# Patient Record
Sex: FEMALE | Race: WHITE | NOT HISPANIC OR LATINO | URBAN - NONMETROPOLITAN AREA
[De-identification: names, ages, dates, MRNs, and addresses within clinical notes are randomized per-mention and may not be internally consistent; named-entity substitution may affect disease eponyms.]

---

## 2016-08-09 PROBLEM — H16.223: Noted: 2019-06-26

## 2019-06-26 ENCOUNTER — IMPORTED ENCOUNTER (OUTPATIENT)
Dept: URBAN - NONMETROPOLITAN AREA CLINIC 1 | Facility: CLINIC | Age: 54
End: 2019-06-26

## 2019-06-26 PROCEDURE — 92014 COMPRE OPH EXAM EST PT 1/>: CPT

## 2019-06-26 PROCEDURE — 92015 DETERMINE REFRACTIVE STATE: CPT

## 2019-06-26 NOTE — PATIENT DISCUSSION
Simple Astigmatism OD/Compound Hyperopuic Astigmatism OS w/Presbyopia-  discussed findings w/patient-  new spectacle Rx issued-  montior yearly or prn HOLLI OU -  diuscussed findings w/patient-  2+ SPK noted today-  start Refresh Jeramy 3 QID OU  samples given -  monitor 1 month f/u or prn; 's Notes: MR 6/26/2019DFE 6/26/2019

## 2019-07-25 ENCOUNTER — IMPORTED ENCOUNTER (OUTPATIENT)
Dept: URBAN - NONMETROPOLITAN AREA CLINIC 1 | Facility: CLINIC | Age: 54
End: 2019-07-25

## 2019-07-25 PROBLEM — H52.03: Noted: 2019-07-25

## 2019-07-25 PROBLEM — H16.223: Noted: 2019-07-25

## 2019-07-25 PROBLEM — H52.223: Noted: 2019-07-25

## 2019-07-25 PROBLEM — H52.4: Noted: 2019-07-25

## 2019-07-25 PROCEDURE — 99213 OFFICE O/P EST LOW 20 MIN: CPT

## 2019-07-25 NOTE — PATIENT DISCUSSION
HOLLI MACKAY -  discussed findings w/patient-  SPK slightly better OD no better OS-  start PF 1% QID OU x 2 weeks-  continue AT's in between Rx drops-  RTC 2 weeks f/u; 's Notes: MR 6/26/2019DFE 6/26/2019

## 2019-08-13 ENCOUNTER — IMPORTED ENCOUNTER (OUTPATIENT)
Dept: URBAN - NONMETROPOLITAN AREA CLINIC 1 | Facility: CLINIC | Age: 54
End: 2019-08-13

## 2019-08-13 PROCEDURE — 99213 OFFICE O/P EST LOW 20 MIN: CPT

## 2019-08-13 NOTE — PATIENT DISCUSSION
HOLLI OU -  discussed findings w/patient-  patient does not notice an improvement in how things are feeling but clinically there is much improvement-  there are no SPK noted OD or OS today-  taper PF 1% to BID OU x 2 weeks then QD OU x 2 weeks then d/c-  continue AT's BID OU-  RTC 1 mo or prn; 's Notes: MR 6/26/2019DFE 6/26/2019

## 2019-09-13 ENCOUNTER — IMPORTED ENCOUNTER (OUTPATIENT)
Dept: URBAN - NONMETROPOLITAN AREA CLINIC 1 | Facility: CLINIC | Age: 54
End: 2019-09-13

## 2019-09-13 PROCEDURE — 99213 OFFICE O/P EST LOW 20 MIN: CPT

## 2019-09-13 NOTE — PATIENT DISCUSSION
HOLLI OU -  discussed findings w/patient-  patient still does not notice much improvement-  she has colitis and eyes seem to be worse during flare-ups-  start Restasis BID OU sent Rx to [facility] in [facility] today-  start FML BID OU sent Rx-  continue Refresh AT's PRN OU-  RTC 6 weeks f/u HOLLI; 's Notes: MR 6/26/2019DFE 6/26/2019

## 2019-10-25 ENCOUNTER — IMPORTED ENCOUNTER (OUTPATIENT)
Dept: URBAN - NONMETROPOLITAN AREA CLINIC 1 | Facility: CLINIC | Age: 54
End: 2019-10-25

## 2019-10-25 PROCEDURE — 99213 OFFICE O/P EST LOW 20 MIN: CPT

## 2019-10-25 NOTE — PATIENT DISCUSSION
HOLLI OU -  discussed findings w/patient-  patient still does not notice much improvement-  she has colitis and eyes seem to be worse during flare-ups-  OD noted much worse than OS today. Patient sleeps on her right side suspect this may be contributing to the dryness. Will have patient start a Gel QHS.  -  continue Restasis BID OU Rx sent to [address] [place] [address] -  start FML QID OD sent Rx [facility] in [facility] -  continue Refresh Jeramy 3's PRN OU-  start Refresh Gel QHS OU -  RTC 2 weeks f/u HOLLI; 's Notes: MR 6/26/2019DFE 6/26/2019

## 2019-11-04 ENCOUNTER — IMPORTED ENCOUNTER (OUTPATIENT)
Dept: URBAN - NONMETROPOLITAN AREA CLINIC 1 | Facility: CLINIC | Age: 54
End: 2019-11-04

## 2019-11-04 PROCEDURE — 99213 OFFICE O/P EST LOW 20 MIN: CPT

## 2019-11-04 NOTE — PATIENT DISCUSSION
HOLLI OU -  discussed findings w/patient-  patient still does not notice much improvement though condition has improved OU -  she has colitis and eyes seem to be worse during flare-ups-  Patient sleeps on her right side suspect this may be contributing to the dryness. Will have patient continue a Gel QHS.  -  continue Restasis BID OU-  taper FML BID OU x 1 week then QD OU x 1 week then d/c -  continue Refresh Jeramy 3's PRN OU-  continue Refresh Gel QHS OU -  RTC 3 month f/u MEGHAN Bentley's Notes: MR 6/26/2019DFE 6/26/2019

## 2020-02-03 ENCOUNTER — IMPORTED ENCOUNTER (OUTPATIENT)
Dept: URBAN - NONMETROPOLITAN AREA CLINIC 1 | Facility: CLINIC | Age: 55
End: 2020-02-03

## 2020-02-03 PROCEDURE — 99213 OFFICE O/P EST LOW 20 MIN: CPT

## 2020-02-03 NOTE — PATIENT DISCUSSION
HOLLI OU -  discussed findings w/patient-  patient still does not notice much improvement though condition has improved OU -  she has colitis and eyes seem to be worse during flare-ups-  Patient sleeps on her right side suspect this may be contributing to the dryness. Will have patient continue a Gel QHS. -  continue Restasis BID OU-  advised patient it often takes at least 6 months for Restasis to build in the system and the patient to notice improvements.  If patient is still having difficulty at next appt will consider switching to a new medication such as Xiidra or [place]-  continue Refresh Jeramy 3's PRN OU-  continue Refresh Gel QHS OU -  RTC 6 month Complete; 's Notes: MR 6/26/2019DFE 6/26/2019

## 2020-07-06 ENCOUNTER — IMPORTED ENCOUNTER (OUTPATIENT)
Dept: URBAN - NONMETROPOLITAN AREA CLINIC 1 | Facility: CLINIC | Age: 55
End: 2020-07-06

## 2020-07-06 PROBLEM — H16.223: Noted: 2020-07-06

## 2020-07-06 PROBLEM — H52.4: Noted: 2020-07-06

## 2020-07-06 PROBLEM — H52.223: Noted: 2020-07-06

## 2020-07-06 PROBLEM — H52.03: Noted: 2020-07-06

## 2020-07-06 PROCEDURE — 92015 DETERMINE REFRACTIVE STATE: CPT

## 2020-07-06 PROCEDURE — 92014 COMPRE OPH EXAM EST PT 1/>: CPT

## 2020-07-06 NOTE — PATIENT DISCUSSION
Compound Hyperopic Astigmatism OU w/Presbyopia-  discussed findings w/patient-  new spectacle Rx issued-  continue to monitor yearly or prnDES OU -  discussed findings w/patient-  patient feels that her eyes are worsening-  clinically there is more SPK noted OU today-  start Xiidra BID OU-  continue Restasis BID OU for now-  continue Refresh Jeramy 3's PRN OU-  continue Refresh Gel QHS OU -  RTC 2-3 weeks f/u or prn; 's Notes: MR 7/6/2020DFE 7/6/2020

## 2020-07-20 ENCOUNTER — IMPORTED ENCOUNTER (OUTPATIENT)
Dept: URBAN - NONMETROPOLITAN AREA CLINIC 1 | Facility: CLINIC | Age: 55
End: 2020-07-20

## 2020-07-20 PROBLEM — H02.88A: Noted: 2021-01-08

## 2020-07-20 PROBLEM — H16.223: Noted: 2020-07-20

## 2020-07-20 PROBLEM — H52.03: Noted: 2020-07-20

## 2020-07-20 PROBLEM — H52.223: Noted: 2020-07-20

## 2020-07-20 PROBLEM — H52.4: Noted: 2020-07-20

## 2020-07-20 PROBLEM — H02.88B: Noted: 2021-01-08

## 2020-07-20 PROCEDURE — 99213 OFFICE O/P EST LOW 20 MIN: CPT

## 2020-07-20 NOTE — PATIENT DISCUSSION
HOLLI OU -  discussed findings w/patient-  patient feels that her eyes are no better-  Xiidra burns upon instillation will have patient d/c at this time-  start FML TID OU-  continue Restasis BID OU -  continue Refresh Jeramy 3's PRN OU-  continue Refresh Gel QHS OU -  RTC 2 weeks f/u HOLLI; 's Notes: HOLLI treatment: [employer] burningMR 7/6/2020DFE 7/6/2020

## 2020-08-03 ENCOUNTER — IMPORTED ENCOUNTER (OUTPATIENT)
Dept: URBAN - NONMETROPOLITAN AREA CLINIC 1 | Facility: CLINIC | Age: 55
End: 2020-08-03

## 2020-08-03 PROCEDURE — 99213 OFFICE O/P EST LOW 20 MIN: CPT

## 2020-08-03 NOTE — PATIENT DISCUSSION
HOLLI OU -  discussed findings w/patient-  patient feels that her eyes are no better-  taper FML 0.1% to QD OU until bottle is empty-  continue Restasis BID OU -  continue Refresh Jeramy 3's PRN OU-  continue Refresh Gel QHS OU -  RTC 4-6 weeks f/u; 's Notes: HOLLI treatment: d/c Melania Hahn burningMR 7/6/2020DFE 7/6/2020

## 2020-09-17 ENCOUNTER — IMPORTED ENCOUNTER (OUTPATIENT)
Dept: URBAN - NONMETROPOLITAN AREA CLINIC 1 | Facility: CLINIC | Age: 55
End: 2020-09-17

## 2020-09-17 PROCEDURE — 99213 OFFICE O/P EST LOW 20 MIN: CPT

## 2020-09-17 NOTE — PATIENT DISCUSSION
HOLLI OU -  discussed findings w/patient-  patient still does not feel eyes are better but she has no SPK OU today-  continue Restasis BID OU -  continue Refresh Jeramy 3's PRN OU-  continue Refresh Gel QHS OU -  RTC 3 mo f/u; 's Notes: HOLLI treatment: d/c Hong Adam [facility] 7/6/2020DFE 7/6/2020

## 2021-01-08 ENCOUNTER — IMPORTED ENCOUNTER (OUTPATIENT)
Dept: URBAN - NONMETROPOLITAN AREA CLINIC 1 | Facility: CLINIC | Age: 56
End: 2021-01-08

## 2021-01-08 PROCEDURE — 99213 OFFICE O/P EST LOW 20 MIN: CPT

## 2021-01-08 NOTE — PATIENT DISCUSSION
HOLLI OU/MGD OU/Blepharitis OU-  discussed findings w/patient-  patient has worsening SPK at this time-  continue Restasis BID OU -  continue Refresh Jeramy 3's PRN OU-  continue Refresh Gel QHS OU -  start Doxycycline 50mg QD-  start Avenova QD OU-  start Hot Compresses QD OU-  RTC 3-4 weeks f/u or prn; Joselito Notes: HOLLI treatment: d/c Shavonne Goncalves burningMR 7/6/2020DFE 7/6/2020

## 2021-01-28 ENCOUNTER — IMPORTED ENCOUNTER (OUTPATIENT)
Dept: URBAN - NONMETROPOLITAN AREA CLINIC 1 | Facility: CLINIC | Age: 56
End: 2021-01-28

## 2021-01-28 PROCEDURE — 99213 OFFICE O/P EST LOW 20 MIN: CPT

## 2021-01-28 NOTE — PATIENT DISCUSSION
HOLLI OU/MGD OU/Blepharitis OU-  discussed findings w/patient-  patient has worsening SPK at this time-  continue Restasis BID OU -  continue Refresh Jeramy 3's PRN OU-  continue Refresh Gel QHS OU -  continue Doxycycline 50mg QD-  continue  Avenova QD OU-  continue Hot Compresses QD OU-  RTC 3 mo f/u HOLLI or prn; 's Notes: HOLLI treatment: d/c Laura Muñoz burning 7/6/2020DFE 7/6/2020

## 2021-04-14 ENCOUNTER — IMPORTED ENCOUNTER (OUTPATIENT)
Dept: URBAN - NONMETROPOLITAN AREA CLINIC 1 | Facility: CLINIC | Age: 56
End: 2021-04-14

## 2021-04-14 PROCEDURE — 99213 OFFICE O/P EST LOW 20 MIN: CPT

## 2021-04-14 NOTE — PATIENT DISCUSSION
HOLLI OU/MGD OU/Blepharitis OU-  discussed findings w/patient-  patient has worsening SPK at this time-  continue Restasis BID OU -  continue Refresh Jeramy 3's PRN OU-  continue Refresh Gel QHS OU -  continue Doxycycline 50mg QD-  continue  Avenova QD OU-  continue Hot Compresses QD OU-  start Lotemax QID OU x 2 weeks-  RTC at patient's convience f/u HOLLI/ Ocular Allergies; 's Notes: HOLLI treatment: d/c Elton Koyanagi burning 7/6/2020DFE 7/6/2020

## 2021-05-21 ENCOUNTER — IMPORTED ENCOUNTER (OUTPATIENT)
Dept: URBAN - NONMETROPOLITAN AREA CLINIC 1 | Facility: CLINIC | Age: 56
End: 2021-05-21

## 2021-05-21 PROBLEM — H02.88A: Noted: 2021-05-21

## 2021-05-21 PROBLEM — H16.141: Noted: 2021-05-21

## 2021-05-21 PROBLEM — H16.223: Noted: 2021-05-21

## 2021-05-21 PROBLEM — H02.88B: Noted: 2021-05-21

## 2021-05-21 PROCEDURE — 99213 OFFICE O/P EST LOW 20 MIN: CPT

## 2021-05-21 PROCEDURE — 92071 CONTACT LENS FITTING FOR TX: CPT

## 2021-05-21 NOTE — PATIENT DISCUSSION
HOLLI OU/MGD OU/Blepharitis OU/Punctate Keratitis OD-  discussed findings w/patient-  huge improvement OD & OS but there is still extensive SPK OD-  continue Restasis BID OU -  continue Refresh Jeramy 3's PRN OU-  continue Refresh Gel QHS OU -  continue Doxycycline 50mg QD-  continue  Avenova QD OU-  continue Hot Compresses QD OU-  placed BCL OD today-  RTC 1 week or prn; 's Notes: HOLLI treatment: d/c Gucci Larios [facility] 7/6/2020DFE 7/6/2020

## 2021-05-28 ENCOUNTER — IMPORTED ENCOUNTER (OUTPATIENT)
Dept: URBAN - NONMETROPOLITAN AREA CLINIC 1 | Facility: CLINIC | Age: 56
End: 2021-05-28

## 2021-05-28 PROCEDURE — 99213 OFFICE O/P EST LOW 20 MIN: CPT

## 2021-05-28 NOTE — PATIENT DISCUSSION
HOLLI OU/MGD OU/Blepharitis OU/Punctate Keratitis OD-  discussed findings w/patient-  no SPK noted today OD-  BCL removed OD-  continue Restasis BID OU -  continue Refresh Jeramy 3's PRN OU-  continue Refresh Gel QHS OU -  continue Doxycycline 50mg QD-  continue  Avenova QD OU-  continue Hot Compresses QD OU-  2-3 mo Routine; 's Notes: HOLLI treatment: d/c Vern Haro burningMR 7/6/2020DFE 7/6/2020

## 2021-08-19 ENCOUNTER — IMPORTED ENCOUNTER (OUTPATIENT)
Dept: URBAN - NONMETROPOLITAN AREA CLINIC 1 | Facility: CLINIC | Age: 56
End: 2021-08-19

## 2021-08-19 PROCEDURE — 92014 COMPRE OPH EXAM EST PT 1/>: CPT

## 2021-08-19 PROCEDURE — 92015 DETERMINE REFRACTIVE STATE: CPT

## 2021-08-19 NOTE — PATIENT DISCUSSION
Mixed Hyperopic Astigmatism w/Presbyopia -  discussed findings w/patient -  new spectacle Rx issued -  continue to monitor HOLLI OU/MGD OU/Blepharitis OU/Punctate Keratitis OD-  discussed findings w/patient-  no SPK noted today-  continue Restasis BID OU -  continue Refresh Jeramy 3's PRN OU-  continue Refresh Gel QHS OU -  continue Doxycycline 50mg QD-  continue  Avenova QD OU-  continue Hot Compresses QD OU; 's Notes: HOLLI treatment: d/c Cece Sanchez burning 7/6/2020DFE 7/6/2020

## 2021-08-30 PROBLEM — H16.141: Noted: 2021-08-30

## 2021-08-30 PROBLEM — H02.88B: Noted: 2021-08-30

## 2021-08-30 PROBLEM — H16.223: Noted: 2021-08-30

## 2021-08-30 PROBLEM — H02.88A: Noted: 2021-08-30

## 2021-08-30 PROBLEM — H52.03: Noted: 2021-08-30

## 2021-11-08 ENCOUNTER — IMPORTED ENCOUNTER (OUTPATIENT)
Dept: URBAN - NONMETROPOLITAN AREA CLINIC 1 | Facility: CLINIC | Age: 56
End: 2021-11-08

## 2021-11-08 PROBLEM — H02.88A: Noted: 2021-11-08

## 2021-11-08 PROBLEM — H02.88B: Noted: 2021-11-08

## 2021-11-08 PROBLEM — H16.223: Noted: 2021-11-08

## 2021-11-08 PROBLEM — H52.03: Noted: 2021-11-08

## 2021-11-08 PROCEDURE — 99213 OFFICE O/P EST LOW 20 MIN: CPT

## 2021-11-08 NOTE — PATIENT DISCUSSION
HOLLI OU/MGD OU/Blepharitis OU/Punctate Keratitis OD-  discussed findings w/patient-  patient's symptoms worsening at this time-  clinically no change in findings-  start Tyrvaya BID-  continue Restasis BID OU -  continue Refresh Jeramy 3's PRN OU-  continue Doxycycline 50mg BID PO-  continue  Avenova QD OU-  continue Hot Compresses QD OU-  RTC 2 weeks f/u or prn; 's Notes: HOLLI treatment: d/c Migue Mueller burning 7/6/2020DFE 7/6/2020

## 2021-11-22 ENCOUNTER — IMPORTED ENCOUNTER (OUTPATIENT)
Dept: URBAN - NONMETROPOLITAN AREA CLINIC 1 | Facility: CLINIC | Age: 56
End: 2021-11-22

## 2021-11-22 PROCEDURE — 99213 OFFICE O/P EST LOW 20 MIN: CPT

## 2021-11-22 NOTE — PATIENT DISCUSSION
HOLLI OU/MGD OU/Blepharitis OU/Punctate Keratitis OD-  discussed findings w/patient-  patient is much better at this time-  continue Tyrvaya BID sent in ERx to [facility]-  continue Restasis QD OU-  continue Refresh Jeramy 3's PRN OU-  continue Doxycycline 50mg BID PO-  continue  Avenova QD OU-  continue Hot Compresses QD OU-  RTC 1 year f/u or prn; Dr's Notes: HOLLI treatment: d/c Geetha holly; darcy Piña and will continue Restasis [facility] 7/6/2020DFE 7/6/2020

## 2022-04-10 ASSESSMENT — VISUAL ACUITY
OD_SC: 20/25
OS_SC: 20/50
OD_SC: 20/20-
OS_PH: 20/25
OD_SC: 20/20
OS_SC: 20/50
OD_SC: 20/20
OS_SC: 20/25
OU_CC: 20/30
OS_SC: 20/25
OU_SC: 20/20-1
OU_SC: 20/20 -2
OS_SC: 20/25
OD_CC: 20/20
OU_SC: 20/20
OU_CC: 20/20
OS_SC: 20/25
OD_SC: 20/25+1
OS_PH: 20/30+2
OS_CC: 20/40
OD_SC: 20/20
OD_SC: 20/25+
OD_SC: 20/70
OU_SC: 20/20
OD_SC: 20/30
OD_SC: 20/25
OD_SC: 20/20
OU_CC: J1
OS_SC: 20/25+
OS_SC: 20/20-
OS_SC: 20/30
OD_SC: 20/25
OU_CC: 20/20
OD_SC: 20/20
OD_PH: 20/25
OS_SC: 20/25
OS_PH: 20/25
OD_SC: 20/20
OS_PH: 20/30
OS_SC: 20/30+2
OS_SC: 20/30-
OS_SC: 20/25-2
OS_SC: 20/30 +2
OD_SC: 20/20
OD_SC: 20/30+2
OU_CC: 20/25
OS_SC: 20/25
OS_SC: 20/25-

## 2022-04-10 ASSESSMENT — TONOMETRY
OS_IOP_MMHG: 19
OS_IOP_MMHG: 15
OD_IOP_MMHG: 14
OS_IOP_MMHG: 16
OS_IOP_MMHG: 16
OD_IOP_MMHG: 15
OD_IOP_MMHG: 12
OS_IOP_MMHG: 19
OD_IOP_MMHG: 15
OS_IOP_MMHG: 15
OS_IOP_MMHG: 15
OD_IOP_MMHG: 15
OS_IOP_MMHG: 15
OD_IOP_MMHG: 19
OS_IOP_MMHG: 17
OD_IOP_MMHG: 14
OS_IOP_MMHG: 15
OS_IOP_MMHG: 19
OD_IOP_MMHG: 16
OD_IOP_MMHG: 15
OS_IOP_MMHG: 14
OS_IOP_MMHG: 18
OS_IOP_MMHG: 15
OD_IOP_MMHG: 17
OD_IOP_MMHG: 15
OD_IOP_MMHG: 15
OS_IOP_MMHG: 13
OD_IOP_MMHG: 16
OD_IOP_MMHG: 18
OS_IOP_MMHG: 16
OD_IOP_MMHG: 16
OD_IOP_MMHG: 17
OD_IOP_MMHG: 20
OS_IOP_MMHG: 15
OS_IOP_MMHG: 17
OS_IOP_MMHG: 16
OD_IOP_MMHG: 19

## 2024-10-30 ENCOUNTER — OFFICE (OUTPATIENT)
Dept: URBAN - NONMETROPOLITAN AREA CLINIC 1 | Facility: CLINIC | Age: 59
End: 2024-10-30
Payer: COMMERCIAL

## 2024-10-30 VITALS
HEIGHT: 65 IN | DIASTOLIC BLOOD PRESSURE: 84 MMHG | HEART RATE: 80 BPM | WEIGHT: 184 LBS | DIASTOLIC BLOOD PRESSURE: 88 MMHG | SYSTOLIC BLOOD PRESSURE: 131 MMHG | SYSTOLIC BLOOD PRESSURE: 130 MMHG

## 2024-10-30 DIAGNOSIS — Z80.0 FAMILY HISTORY OF MALIGNANT NEOPLASM OF DIGESTIVE ORGANS: ICD-10-CM

## 2024-10-30 DIAGNOSIS — K51.90 ULCERATIVE COLITIS, UNSPECIFIED, WITHOUT COMPLICATIONS: ICD-10-CM

## 2024-10-30 PROCEDURE — 99204 OFFICE O/P NEW MOD 45 MIN: CPT | Performed by: NURSE PRACTITIONER

## 2024-10-30 RX ORDER — MESALAMINE 1.2 G/1
TABLET, DELAYED RELEASE ORAL
Qty: 90 | Refills: 3 | Status: ACTIVE

## 2025-01-30 ENCOUNTER — OFFICE (OUTPATIENT)
Dept: URBAN - NONMETROPOLITAN AREA CLINIC 1 | Facility: CLINIC | Age: 60
End: 2025-01-30
Payer: COMMERCIAL

## 2025-01-30 VITALS
WEIGHT: 183 LBS | DIASTOLIC BLOOD PRESSURE: 91 MMHG | HEIGHT: 65 IN | HEART RATE: 112 BPM | SYSTOLIC BLOOD PRESSURE: 145 MMHG | SYSTOLIC BLOOD PRESSURE: 158 MMHG | DIASTOLIC BLOOD PRESSURE: 80 MMHG

## 2025-01-30 DIAGNOSIS — R10.32 LEFT LOWER QUADRANT PAIN: ICD-10-CM

## 2025-01-30 DIAGNOSIS — Z80.0 FAMILY HISTORY OF MALIGNANT NEOPLASM OF DIGESTIVE ORGANS: ICD-10-CM

## 2025-01-30 DIAGNOSIS — K51.90 ULCERATIVE COLITIS, UNSPECIFIED, WITHOUT COMPLICATIONS: ICD-10-CM

## 2025-01-30 DIAGNOSIS — R10.31 RIGHT LOWER QUADRANT PAIN: ICD-10-CM

## 2025-01-30 PROCEDURE — 99214 OFFICE O/P EST MOD 30 MIN: CPT | Performed by: NURSE PRACTITIONER

## 2025-01-30 RX ORDER — PREDNISONE 10 MG/1
TABLET ORAL
Qty: 70 | Refills: 0 | Status: ACTIVE
Start: 2025-01-30

## 2025-02-12 ENCOUNTER — AMBULATORY SURGICAL CENTER (OUTPATIENT)
Dept: URBAN - NONMETROPOLITAN AREA SURGERY 1 | Facility: SURGERY | Age: 60
End: 2025-02-12

## 2025-02-12 ENCOUNTER — OFFICE (OUTPATIENT)
Dept: URBAN - METROPOLITAN AREA PATHOLOGY 15 | Facility: PATHOLOGY | Age: 60
End: 2025-02-12

## 2025-02-12 VITALS
DIASTOLIC BLOOD PRESSURE: 64 MMHG | RESPIRATION RATE: 17 BRPM | RESPIRATION RATE: 18 BRPM | SYSTOLIC BLOOD PRESSURE: 119 MMHG | HEART RATE: 137 BPM | HEART RATE: 115 BPM | DIASTOLIC BLOOD PRESSURE: 66 MMHG | HEART RATE: 123 BPM | OXYGEN SATURATION: 98 % | SYSTOLIC BLOOD PRESSURE: 93 MMHG | RESPIRATION RATE: 16 BRPM | SYSTOLIC BLOOD PRESSURE: 92 MMHG | SYSTOLIC BLOOD PRESSURE: 97 MMHG | DIASTOLIC BLOOD PRESSURE: 81 MMHG | WEIGHT: 176 LBS | DIASTOLIC BLOOD PRESSURE: 66 MMHG | SYSTOLIC BLOOD PRESSURE: 119 MMHG | HEART RATE: 137 BPM | HEART RATE: 106 BPM | OXYGEN SATURATION: 95 % | HEART RATE: 127 BPM | DIASTOLIC BLOOD PRESSURE: 98 MMHG | SYSTOLIC BLOOD PRESSURE: 106 MMHG | DIASTOLIC BLOOD PRESSURE: 81 MMHG | HEART RATE: 125 BPM | HEART RATE: 125 BPM | DIASTOLIC BLOOD PRESSURE: 73 MMHG | SYSTOLIC BLOOD PRESSURE: 97 MMHG | TEMPERATURE: 98.6 F | HEART RATE: 125 BPM | SYSTOLIC BLOOD PRESSURE: 92 MMHG | DIASTOLIC BLOOD PRESSURE: 56 MMHG | OXYGEN SATURATION: 95 % | DIASTOLIC BLOOD PRESSURE: 98 MMHG | DIASTOLIC BLOOD PRESSURE: 98 MMHG | RESPIRATION RATE: 18 BRPM | WEIGHT: 176 LBS | HEART RATE: 123 BPM | DIASTOLIC BLOOD PRESSURE: 56 MMHG | RESPIRATION RATE: 18 BRPM | HEART RATE: 127 BPM | OXYGEN SATURATION: 99 % | HEART RATE: 115 BPM | SYSTOLIC BLOOD PRESSURE: 93 MMHG | HEIGHT: 65 IN | SYSTOLIC BLOOD PRESSURE: 127 MMHG | HEART RATE: 106 BPM | HEART RATE: 106 BPM | RESPIRATION RATE: 17 BRPM | SYSTOLIC BLOOD PRESSURE: 92 MMHG | OXYGEN SATURATION: 98 % | HEART RATE: 127 BPM | OXYGEN SATURATION: 99 % | DIASTOLIC BLOOD PRESSURE: 64 MMHG | OXYGEN SATURATION: 98 % | SYSTOLIC BLOOD PRESSURE: 106 MMHG | TEMPERATURE: 98.6 F | HEART RATE: 123 BPM | OXYGEN SATURATION: 99 % | DIASTOLIC BLOOD PRESSURE: 64 MMHG | RESPIRATION RATE: 17 BRPM | HEIGHT: 65 IN | HEART RATE: 115 BPM | OXYGEN SATURATION: 100 % | WEIGHT: 176 LBS | HEIGHT: 65 IN | SYSTOLIC BLOOD PRESSURE: 119 MMHG | HEART RATE: 137 BPM | OXYGEN SATURATION: 95 % | SYSTOLIC BLOOD PRESSURE: 93 MMHG | DIASTOLIC BLOOD PRESSURE: 73 MMHG | SYSTOLIC BLOOD PRESSURE: 127 MMHG | SYSTOLIC BLOOD PRESSURE: 106 MMHG | DIASTOLIC BLOOD PRESSURE: 66 MMHG | DIASTOLIC BLOOD PRESSURE: 56 MMHG | SYSTOLIC BLOOD PRESSURE: 127 MMHG | SYSTOLIC BLOOD PRESSURE: 97 MMHG | OXYGEN SATURATION: 100 % | DIASTOLIC BLOOD PRESSURE: 81 MMHG | RESPIRATION RATE: 16 BRPM | OXYGEN SATURATION: 100 % | DIASTOLIC BLOOD PRESSURE: 73 MMHG | TEMPERATURE: 98.6 F | RESPIRATION RATE: 16 BRPM

## 2025-02-12 DIAGNOSIS — K63.89 OTHER SPECIFIED DISEASES OF INTESTINE: ICD-10-CM

## 2025-02-12 DIAGNOSIS — Z80.0 FAMILY HISTORY OF MALIGNANT NEOPLASM OF DIGESTIVE ORGANS: ICD-10-CM

## 2025-02-12 PROBLEM — K52.89 INFLAMMATORY BOWEL DISEASE OF THE INTESTINE IF MORE PRECISE DIAGNOSIS OR DETERMINATION OF THE EXTENT/SEVERITY OF ACTIVITY OF DISEASE WILL INFLUENCE IMMEDIATE/FUTURE MANAGEMENT: Status: ACTIVE | Noted: 2025-02-12

## 2025-02-12 PROCEDURE — 45380 COLONOSCOPY AND BIOPSY: CPT | Performed by: INTERNAL MEDICINE

## 2025-02-12 PROCEDURE — 88305 TISSUE EXAM BY PATHOLOGIST: CPT | Performed by: STUDENT IN AN ORGANIZED HEALTH CARE EDUCATION/TRAINING PROGRAM

## 2025-02-12 RX ADMIN — PROPOFOL 800 MG: 10 INJECTION, EMULSION INTRAVENOUS at 13:30

## 2025-02-14 LAB
GASTRO ONE PATHOLOGY: PDF REPORT: (no result)